# Patient Record
Sex: MALE | Race: WHITE | URBAN - METROPOLITAN AREA
[De-identification: names, ages, dates, MRNs, and addresses within clinical notes are randomized per-mention and may not be internally consistent; named-entity substitution may affect disease eponyms.]

---

## 2017-02-02 ENCOUNTER — IMPORTED ENCOUNTER (OUTPATIENT)
Dept: URBAN - METROPOLITAN AREA CLINIC 50 | Facility: CLINIC | Age: 77
End: 2017-02-02

## 2020-03-10 ENCOUNTER — IMPORTED ENCOUNTER (OUTPATIENT)
Dept: URBAN - METROPOLITAN AREA CLINIC 50 | Facility: CLINIC | Age: 80
End: 2020-03-10

## 2020-03-17 NOTE — PATIENT DISCUSSION
1+ VMT, We reviewed the signs and symptoms of retinal tear/retinal detachment and the importance of prompt evaluation should there be increasing floaters, new flashing lights, or decreasing peripheral vision in either eye at any time.

## 2020-03-17 NOTE — PATIENT DISCUSSION
Will plan for cataract surgery prior to PPV/MP, follow up in 1 month.  Will discuss surgery at that time.

## 2020-03-17 NOTE — PATIENT DISCUSSION
Start Durezol BID and Bromsite BID.  Will likely need PPV/MP.  Start gtts for now. F/U in 1 month.  Will need CE prior to PPV/MP to allow for more complete retinal surgery.

## 2020-04-10 NOTE — PATIENT DISCUSSION
Improving, Continue Durezol BID and Bromsite BID.  Defer PPV/MP for now. Will need CE prior to PPV/MP to allow for more complete retinal surgery.

## 2020-04-10 NOTE — PATIENT DISCUSSION
Improving VMT, We reviewed the signs and symptoms of retinal tear/retinal detachment and the importance of prompt evaluation should there be increasing floaters, new flashing lights, or decreasing peripheral vision in either eye at any time.

## 2020-05-08 NOTE — PATIENT DISCUSSION
not closing, decrease durezol to QDAY for 1 week then stop.  Will plan for PPV/MP after CE to allow for a more complete PPV.

## 2020-05-11 NOTE — PATIENT DISCUSSION
Do not recommend MF lens option is secondary to retinal pathology. Patient and  understands that lens options and measurements are limited secondary to Macular hole. Advised that any residual correction after macular hole sx may be corrected with corneal laser vision correction.

## 2020-05-11 NOTE — PATIENT DISCUSSION
The cataracts are responsible for some of the patient's decrease in vision. Patient understands she will need to wear glasses for her optimal vision at all distances.

## 2020-05-20 NOTE — PATIENT DISCUSSION
Proceed with OD first. EYE OD, IOL TYPE MONOFOCAL LENS, POST OPERATIVE TARGET PL/-0.50, PACKAGE Custom or Basic +.

## 2020-05-20 NOTE — PATIENT DISCUSSION
Cataract is visually significant. Monofocal only. The patient is cleared for cataract surgery by Dr. Grazyna Chan.

## 2020-05-26 NOTE — PATIENT DISCUSSION
Cataract is visually significant. Monofocal only. The patient is cleared for cataract surgery by Dr. Micheline Drew.

## 2020-06-01 NOTE — PATIENT DISCUSSION
Cataract is visually significant. Monofocal only. The patient is cleared for cataract surgery by Dr. Junior Amaya.

## 2020-06-05 NOTE — PATIENT DISCUSSION
Continue eye drops per instructions. Patient notes darrin sensation after using post op drops. Recommend trying ATs prior to using post op drops. Will consider changing drops if irritation persist with AT use.

## 2020-06-05 NOTE — PATIENT DISCUSSION
Cataract is visually significant. Monofocal only. The patient is cleared for cataract surgery by Dr. Coco Guerra.

## 2020-06-10 NOTE — PATIENT DISCUSSION
Cataract is visually significant. Monofocal only. The patient is cleared for cataract surgery by Dr. Kiarra Elliott.

## 2020-06-11 NOTE — PATIENT DISCUSSION
Cataract is visually significant. Monofocal only. The patient is cleared for cataract surgery by Dr. Jaydon Sinclair.

## 2020-06-11 NOTE — PATIENT DISCUSSION
1 suture removed today at slit lamp without incident. Recommend to continue ofloxacin drops for an additional 3 days.

## 2020-06-12 NOTE — PATIENT DISCUSSION
Cataract is visually significant. Monofocal only. The patient is cleared for cataract surgery by Dr. Sary Purvis.

## 2020-06-12 NOTE — PATIENT DISCUSSION
Post-op instructions given. Discussed drops-Start Pred QID, Moxi QID untill Friday then D/C, positioning-Head face down 50-55 minutes every hour for 3 days then pt to sleep on side, no strenuous activity and eye protection. Call immediately if eye pain or loss of vision.

## 2020-06-15 NOTE — PATIENT DISCUSSION
We reviewed the signs and symptoms of retinal tear/retinal detachment and the importance of prompt evaluation should there be increasing floaters, new flashing lights, or decreasing peripheral vision in either eye at any time. no

## 2020-06-15 NOTE — PATIENT DISCUSSION
Cataract is visually significant. Monofocal only. The patient is cleared for cataract surgery by Dr. Lynda Echavarria.

## 2020-06-17 NOTE — PATIENT DISCUSSION
Cataract is visually significant. Monofocal only. The patient is cleared for cataract surgery by Dr. Earl Crooks.

## 2020-07-10 NOTE — PATIENT DISCUSSION
Discussed mild steroid lotemax of FML. Rec Punctual plugs LLL today. Pt consents. Start lotemax qd for 2 weeks then every other day for 2 weeks OS. RV for IOP check in 1 month.

## 2020-07-10 NOTE — PATIENT DISCUSSION
Cataract is visually significant. Monofocal only. The patient is cleared for cataract surgery by Dr. Truman Nayak.

## 2020-07-10 NOTE — PATIENT DISCUSSION
Discussed mild steroid lotemax or FML. Rec Punctual plugs LLL today. Pt consents. Start lotemax qd for 2 weeks then every other day for 2 weeks OS. RV for IOP check in 1 month.

## 2020-07-10 NOTE — PROCEDURE NOTE: CLINICAL
PROCEDURE NOTE: Punctal Plugs, Quintess Dissolvable ([id number], ) OS. Diagnosis: Sicca Syndrome, Unspecified. Prior to treatment, the risks/benefits/alternatives were discussed. The patient wished to proceed with procedure. Temporary collagen plugs were inserted. Patient tolerated procedure well. There were no complications. Post procedure instructions given. Shannon Jain

## 2020-07-10 NOTE — PATIENT DISCUSSION
Worsening of traction found on OCT testing today after patient complaint of worsening since last visit.

## 2020-08-10 NOTE — PATIENT DISCUSSION
Cataract is visually significant. Monofocal only. The patient is cleared for cataract surgery by Dr. Thurmon Hammans.

## 2020-08-17 NOTE — PATIENT DISCUSSION
Cataract is visually significant. Monofocal only. The patient is cleared for cataract surgery by Dr. Hank Kim.

## 2020-09-14 NOTE — PATIENT DISCUSSION
Cataract is visually significant. Monofocal only. The patient is cleared for cataract surgery by Dr. Myriam Albrecht.

## 2020-10-02 NOTE — PATIENT DISCUSSION
Post Op Month 4: Doing well, hole closed, macula flat. Taper pred TID for 2W, BID for 2W, QDAY for 2W then stop.

## 2020-10-29 ENCOUNTER — IMPORTED ENCOUNTER (OUTPATIENT)
Dept: URBAN - METROPOLITAN AREA CLINIC 50 | Facility: CLINIC | Age: 80
End: 2020-10-29

## 2020-12-04 NOTE — PATIENT DISCUSSION
Only happens at night. also with eye movement,  no benefit with serum tears, on  Bromsite. Periorbital pain with head ache that radiates to back of head.  No evidence of GCA, consider GABAPENTIN, F/U with Dr. William Hilton at [facility].

## 2020-12-04 NOTE — PATIENT DISCUSSION
Proceed with OD first. EYE OD, IOL TYPE MONOFOCAL LENS, POST OPERATIVE TARGET PL/-0.50, PACKAGE Custom or Basic +. Render Post-Care Instructions In Note?: yes Bill For Surgical Tray: no Size Of Lesion In Cm: 0.5 Size Of Lesion After Curettage: 1.4 Additional Information: (Optional): The wound was cleaned, and a pressure dressing was applied.  The patient received detailed post-op instructions. Anesthesia Volume In Cc: 1 Detail Level: Detailed Consent was obtained from the patient. The risks, benefits and alternatives to therapy were discussed in detail. Specifically, the risks of infection, scarring, bleeding, prolonged wound healing, nerve injury, incomplete removal, allergy to anesthesia and recurrence were addressed. Alternatives to ED&C, such as: surgical removal and XRT were also discussed.  Prior to the procedure, the treatment site was clearly identified and confirmed by the patient. All components of Universal Protocol/PAUSE Rule completed. Number Of Curettages: 3 Anesthesia Type: 2% lidocaine with epinephrine Post-Care Instructions: I reviewed with the patient in detail post-care instructions. Patient is to keep the area dry for 48 hours, and not to engage in any swimming until the area is healed. Should the patient develop any fevers, chills, bleeding, severe pain patient will contact the office immediately. What Was Performed First?: Curettage Cautery Type: electrodesiccation Bill As A Line Item Or As Units: Line Item

## 2021-03-10 NOTE — PATIENT DISCUSSION
Dry eye could cause some pain and discomfort but pts symptoms not atypical for dry eye. Pt reports to JRB relief from OTC tears.

## 2021-03-10 NOTE — PATIENT DISCUSSION
Reviewed records from Dr. Ross Kwok. 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

## 2021-03-10 NOTE — PATIENT DISCUSSION
Only happens at night. also with eye movement,  no benefit with serum tears, on  Bromsite. Periorbital pain with head ache that radiates to back of head.  No evidence of GCA.

## 2021-03-10 NOTE — PATIENT DISCUSSION
Restart Restasis . Pt stated has not been able to obtain due to insurance. Recommend going through [facility] for cyclosporine. [facility] form filled out and given some information to pt.

## 2021-03-10 NOTE — PATIENT DISCUSSION
Condition was explained, and patient was assured condition is not a risk to vision or eye health. Blood usually requires 1 – 2 weeks to resolve.

## 2021-03-10 NOTE — PROCEDURE NOTE: CLINICAL
PROCEDURE NOTE: Punctal Plugs, Quintess Dissolvable ([id number], [id number]) Bilateral Lower Lids. Diagnosis: Sicca Syndrome with Keratoconjunctivitis. Prior to treatment, the risks/benefits/alternatives were discussed. The patient wished to proceed with procedure. Verbal consent obtained. Temporary collagen plugs were inserted. Patient tolerated procedure well. There were no complications. Post procedure instructions given. Delores Stark

## 2021-04-02 NOTE — PATIENT DISCUSSION
Cataract is visually significant. Monofocal only. The patient is cleared for cataract surgery by Dr. Christopher Rogers.

## 2021-04-02 NOTE — PATIENT DISCUSSION
surface related, start serum tears TID for next month if no improvement then start Bromsite BID. Yes

## 2021-04-02 NOTE — PATIENT DISCUSSION
Cont Restasis . Restart EES saeid at bedtime for 1 month. Pt stated she has at home, will not E-rx unless pt call for prescription.

## 2021-04-02 NOTE — PATIENT DISCUSSION
Reviewed records from Dr. Kylie Lopez. 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

## 2021-04-17 ASSESSMENT — VISUAL ACUITY
OD_CC: J1+
OS_CC: 20/25
OD_CC: 20/25
OD_BAT: 20/25
OD_SC: 20/25
OS_SC: 20/25
OS_BAT: 20/40
OS_OTHER: 20/40. 20/50.
OS_CC: J1+
OD_OTHER: 20/25. 20/30.

## 2021-04-17 ASSESSMENT — TONOMETRY
OD_IOP_MMHG: 9
OS_IOP_MMHG: 08
OD_IOP_MMHG: 08
OS_IOP_MMHG: 9

## 2021-05-05 NOTE — PATIENT DISCUSSION
The cataracts are responsible for the patient's decrease in vision. Nasal Turnover Hinge Flap Text: The defect edges were debeveled with a #15 scalpel blade.  Given the size, depth, location of the defect and the defect being full thickness a nasal turnover hinge flap was deemed most appropriate.  Using a sterile surgical marker, an appropriate hinge flap was drawn incorporating the defect. The area thus outlined was incised with a #15 scalpel blade. The flap was designed to recreate the nasal mucosal lining and the alar rim. The skin margins were undermined to an appropriate distance in all directions utilizing iris scissors.

## 2021-08-08 NOTE — PATIENT DISCUSSION
Use artificial tears to affected eye(s) as needed. phenylephrine 50mcg injected x 3 into corpus cavernosa

## 2021-08-20 NOTE — PATIENT DISCUSSION
Cataract is visually significant. Monofocal only. The patient is cleared for cataract surgery by Dr. Ana Maria Smiley.

## 2021-08-20 NOTE — PATIENT DISCUSSION
Reviewed records from Dr. Sherie Travis. 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

## 2021-11-09 NOTE — PATIENT DISCUSSION
Cataract is visually significant. Monofocal only. The patient is cleared for cataract surgery by Dr. Benjamín Uribe.

## 2021-12-01 NOTE — PATIENT DISCUSSION
Pt reports some improvement in symptoms with plugs previously and would like to have repeated today.

## 2021-12-01 NOTE — PROCEDURE NOTE: CLINICAL
PROCEDURE NOTE: Punctal Plugs, Extended Bilateral Lower Lids. Diagnosis: Dry Eye Syndrome. Prior to treatment, the risks/benefits/alternatives were discussed. The patient wished to proceed with procedure. Temporary plugs were inserted. Patient tolerated procedure well. There were no complications. Post procedure instructions given. Oksana Sr

## 2022-02-15 NOTE — PATIENT DISCUSSION
Stable, We reviewed the pathophysiology of posterior vitreous detachment and the occasional association with retinal tear and retinal detachment.  No RT/RD.

## 2022-03-09 NOTE — PATIENT DISCUSSION
Pt reports that during testing today noticed some double vision only with OS and reports has not noticed prior to when having testing today.

## 2022-03-09 NOTE — PATIENT DISCUSSION
Cataract is visually significant. Monofocal only. The patient is cleared for cataract surgery by Dr. Lambert [facility].

## 2022-03-09 NOTE — PROCEDURE NOTE: CLINICAL
PROCEDURE NOTE: Punctal Plugs, Extended Bilateral Lower Lids. Diagnosis: Dry Eye Syndrome. Prior to treatment, the risks/benefits/alternatives were discussed. The patient wished to proceed with procedure. Temporary collagen plugs were inserted BLL. Patient tolerated procedure well. There were no complications. Post procedure instructions given. Phyllis Otero

## 2022-03-09 NOTE — PATIENT DISCUSSION
Pt bothered and symptomatic and would like to have plugs repeated. Pt feels that plugs did help symptoms.

## 2022-05-11 NOTE — PATIENT DISCUSSION
Discussed baby shampoo eyelid scrub QAM. Patient was issued a HEP handout from [email or web address]. Exercise selection, recommended resistance, and proper form/technique were reviewed with the patient. Patient verbalized understanding/comprehension of instruction and recommendations. View at [email or web address] using code: [id number]

## 2022-05-26 NOTE — PATIENT DISCUSSION
Continue eye drops per instructions. Patient notes darrin sensation after using post op drops. Recommend trying ATs prior to using post op drops. Will consider changing drops if irritation persist with AT use. Area M Indication Text: Tumors in this location are included in Area M (cheek, forehead, scalp, neck, jawline and pretibial skin).  Mohs surgery is indicated for tumors in these anatomic locations.

## 2022-06-15 ENCOUNTER — PREPPED CHART (OUTPATIENT)
Dept: URBAN - METROPOLITAN AREA CLINIC 50 | Facility: CLINIC | Age: 82
End: 2022-06-15

## 2022-10-24 NOTE — PATIENT DISCUSSION
Cataract is visually significant. Monofocal only. The patient is cleared for cataract surgery by Dr. Deisi Olguin.

## 2023-08-17 NOTE — PATIENT DISCUSSION
Due to CME, Start Pred QID, Ketorolac QID. Propranolol Counseling:  I discussed with the patient the risks of propranolol including but not limited to low heart rate, low blood pressure, low blood sugar, restlessness and increased cold sensitivity. They should call the office if they experience any of these side effects.

## 2023-09-07 NOTE — PATIENT DISCUSSION
Patient understands there is an increased risk of corneal edema after cataract surgery. Detail Level: Simple Render Risk Assessment In Note?: no Additional Notes: -\\nPatient was counseled on cosmetic removal, discussed with pt lesion is benign and does not require removal. Pt differed treatment Additional Notes: Vascular benign growth, monitor for changes in color, shape or size